# Patient Record
Sex: MALE | Race: WHITE | NOT HISPANIC OR LATINO | URBAN - METROPOLITAN AREA
[De-identification: names, ages, dates, MRNs, and addresses within clinical notes are randomized per-mention and may not be internally consistent; named-entity substitution may affect disease eponyms.]

---

## 2018-07-19 ENCOUNTER — OFFICE VISIT (OUTPATIENT)
Dept: URBAN - METROPOLITAN AREA CLINIC 18 | Facility: CLINIC | Age: 63
End: 2018-07-19
Payer: COMMERCIAL

## 2018-07-19 PROCEDURE — 92133 CPTRZD OPH DX IMG PST SGM ON: CPT | Performed by: OPTOMETRIST

## 2018-07-19 PROCEDURE — 92014 COMPRE OPH EXAM EST PT 1/>: CPT | Performed by: OPTOMETRIST

## 2018-07-19 RX ORDER — LATANOPROST 50 UG/ML
0.005 % SOLUTION OPHTHALMIC
Qty: 1 | Refills: 4 | Status: INACTIVE
Start: 2018-07-19 | End: 2018-08-23

## 2018-07-19 ASSESSMENT — INTRAOCULAR PRESSURE
OD: 20
OD: 28
OS: 30
OS: 25

## 2018-07-19 ASSESSMENT — KERATOMETRY
OS: 44.50
OD: 44.63

## 2018-07-19 NOTE — IMPRESSION/PLAN
Impression: Ocular hypertension, bilateral: H40.053. ONH stable OU, s/p LPI OU Tmax 30/29, /612
7/18 /93 stable in [facility] OU vs. OCT x 1yr ago  Plan: Discussed diagnosis in detail with patient. IOP elevated today. Recommend pt to re-start Latanoprost QHS OU (ERX'd) Goal IOP low 20s due to CCTs.

## 2018-08-23 ENCOUNTER — OFFICE VISIT (OUTPATIENT)
Dept: URBAN - METROPOLITAN AREA CLINIC 18 | Facility: CLINIC | Age: 63
End: 2018-08-23
Payer: COMMERCIAL

## 2018-08-23 PROCEDURE — 99213 OFFICE O/P EST LOW 20 MIN: CPT | Performed by: OPTOMETRIST

## 2018-08-23 RX ORDER — LATANOPROST 50 UG/ML
0.005 % SOLUTION OPHTHALMIC
Qty: 1 | Refills: 6 | Status: INACTIVE
Start: 2018-08-23 | End: 2018-12-06

## 2018-08-23 ASSESSMENT — KERATOMETRY
OD: 44.50
OS: 45.13

## 2018-08-23 ASSESSMENT — INTRAOCULAR PRESSURE
OS: 17
OD: 18

## 2018-12-06 ENCOUNTER — OFFICE VISIT (OUTPATIENT)
Dept: URBAN - METROPOLITAN AREA CLINIC 18 | Facility: CLINIC | Age: 63
End: 2018-12-06
Payer: COMMERCIAL

## 2018-12-06 PROCEDURE — 99213 OFFICE O/P EST LOW 20 MIN: CPT | Performed by: OPTOMETRIST

## 2018-12-06 RX ORDER — LATANOPROST 50 UG/ML
0.005 % SOLUTION OPHTHALMIC
Qty: 1 | Refills: 6 | Status: INACTIVE
Start: 2018-12-06 | End: 2019-08-23

## 2018-12-06 RX ORDER — TIMOLOL MALEATE 2.5 MG/ML
0.25 % SOLUTION/ DROPS OPHTHALMIC
Qty: 1 | Refills: 6 | Status: INACTIVE
Start: 2018-12-06 | End: 2019-08-23

## 2018-12-06 ASSESSMENT — KERATOMETRY
OD: 44.63
OS: 44.50

## 2018-12-06 ASSESSMENT — INTRAOCULAR PRESSURE
OD: 37
OS: 39

## 2018-12-06 NOTE — IMPRESSION/PLAN
Impression: Ocular hypertension, bilateral: H40.053. ONH stable OU, s/p LPI OU Tmax 30/29, /612
7/18 /93 stable in [facility] OU vs. OCT x 1yr ago IOP high today OU Plan: Discussed diagnosis in detail with patient. IOP stable. Recommend pt to re-start Latanoprost QHS OU (Lumigan) and Istalol BID OU (Timolol). Sample of lumigan and istalol given today.

## 2018-12-20 ENCOUNTER — OFFICE VISIT (OUTPATIENT)
Dept: URBAN - METROPOLITAN AREA CLINIC 18 | Facility: CLINIC | Age: 63
End: 2018-12-20
Payer: COMMERCIAL

## 2018-12-20 DIAGNOSIS — H40.053 OCULAR HYPERTENSION, BILATERAL: Primary | ICD-10-CM

## 2018-12-20 PROCEDURE — 99213 OFFICE O/P EST LOW 20 MIN: CPT | Performed by: OPTOMETRIST

## 2018-12-20 ASSESSMENT — KERATOMETRY
OD: 44.38
OS: 44.25

## 2018-12-20 ASSESSMENT — INTRAOCULAR PRESSURE
OS: 17
OD: 20

## 2018-12-20 NOTE — IMPRESSION/PLAN
Impression: Ocular hypertension, bilateral: H40.053. ONH stable OU, s/p LPI OU Tmax 30/29, /612
7/18 /93 stable in [facility] OU vs. OCT x 1yr ago IOP today 20/17 Goal teens. Plan: Discussed diagnosis in detail with patient. IOP stable. Pt will continue to use Latanoprost QHS OU (Lumigan) and Timolol BID OU.

## 2019-01-31 ENCOUNTER — OFFICE VISIT (OUTPATIENT)
Dept: URBAN - METROPOLITAN AREA CLINIC 18 | Facility: CLINIC | Age: 64
End: 2019-01-31
Payer: COMMERCIAL

## 2019-01-31 PROCEDURE — 99213 OFFICE O/P EST LOW 20 MIN: CPT | Performed by: OPTOMETRIST

## 2019-01-31 ASSESSMENT — KERATOMETRY
OS: 44.25
OD: 45.13

## 2019-01-31 ASSESSMENT — INTRAOCULAR PRESSURE
OD: 18
OS: 18

## 2019-04-08 ENCOUNTER — TESTING ONLY (OUTPATIENT)
Dept: URBAN - METROPOLITAN AREA CLINIC 18 | Facility: CLINIC | Age: 64
End: 2019-04-08
Payer: COMMERCIAL

## 2019-04-08 PROCEDURE — 92133 CPTRZD OPH DX IMG PST SGM ON: CPT | Performed by: OPTOMETRIST

## 2019-04-08 PROCEDURE — 76514 ECHO EXAM OF EYE THICKNESS: CPT | Performed by: OPTOMETRIST

## 2019-04-08 PROCEDURE — 92083 EXTENDED VISUAL FIELD XM: CPT | Performed by: OPTOMETRIST

## 2019-04-25 ENCOUNTER — OFFICE VISIT (OUTPATIENT)
Dept: URBAN - METROPOLITAN AREA CLINIC 18 | Facility: CLINIC | Age: 64
End: 2019-04-25
Payer: COMMERCIAL

## 2019-04-25 PROCEDURE — 99213 OFFICE O/P EST LOW 20 MIN: CPT | Performed by: OPTOMETRIST

## 2019-04-25 ASSESSMENT — INTRAOCULAR PRESSURE
OS: 9
OD: 10

## 2019-08-23 ENCOUNTER — OFFICE VISIT (OUTPATIENT)
Dept: URBAN - METROPOLITAN AREA CLINIC 18 | Facility: CLINIC | Age: 64
End: 2019-08-23
Payer: COMMERCIAL

## 2019-08-23 DIAGNOSIS — H04.123 DRY EYE SYNDROME OF BILATERAL LACRIMAL GLANDS: Chronic | ICD-10-CM

## 2019-08-23 DIAGNOSIS — H25.13 AGE-RELATED NUCLEAR CATARACT, BILATERAL: Chronic | ICD-10-CM

## 2019-08-23 PROCEDURE — 99213 OFFICE O/P EST LOW 20 MIN: CPT | Performed by: OPTOMETRIST

## 2019-08-23 RX ORDER — DIPHENHYDRAMINE HCL 25 MG
CAPSULE ORAL
Qty: 1 | Refills: 11 | Status: ACTIVE
Start: 2019-08-23

## 2019-08-23 RX ORDER — LATANOPROST 50 UG/ML
0.005 % SOLUTION OPHTHALMIC
Qty: 1 | Refills: 6 | Status: INACTIVE
Start: 2019-08-23 | End: 2020-12-16

## 2019-08-23 RX ORDER — TIMOLOL MALEATE 2.5 MG/ML
0.25 % SOLUTION/ DROPS OPHTHALMIC
Qty: 1 | Refills: 6 | Status: INACTIVE
Start: 2019-08-23 | End: 2020-12-16

## 2019-08-23 ASSESSMENT — INTRAOCULAR PRESSURE
OD: 12
OS: 13

## 2019-08-23 NOTE — IMPRESSION/PLAN
Impression: Ocular hypertension, bilateral: H40.053. Plan: Intraocular pressure well controlled, tolerating medications. Will continue with same regimen.  timolol BID OU and latanoprost qhs OU

## 2021-10-07 ENCOUNTER — OFFICE VISIT (OUTPATIENT)
Dept: URBAN - METROPOLITAN AREA CLINIC 18 | Facility: CLINIC | Age: 66
End: 2021-10-07
Payer: MEDICARE

## 2021-10-07 PROCEDURE — 92133 CPTRZD OPH DX IMG PST SGM ON: CPT | Performed by: STUDENT IN AN ORGANIZED HEALTH CARE EDUCATION/TRAINING PROGRAM

## 2021-10-07 PROCEDURE — 99214 OFFICE O/P EST MOD 30 MIN: CPT | Performed by: STUDENT IN AN ORGANIZED HEALTH CARE EDUCATION/TRAINING PROGRAM

## 2021-10-07 RX ORDER — LATANOPROST 50 UG/ML
0.005 % SOLUTION OPHTHALMIC
Qty: 10 | Refills: 6 | Status: ACTIVE
Start: 2021-10-07

## 2021-10-07 RX ORDER — TIMOLOL MALEATE 2.5 MG/ML
0.25 % SOLUTION/ DROPS OPHTHALMIC
Qty: 5 | Refills: 6 | Status: INACTIVE
Start: 2021-10-07 | End: 2022-02-18

## 2021-10-07 ASSESSMENT — INTRAOCULAR PRESSURE
OS: 22
OD: 20

## 2021-10-07 NOTE — IMPRESSION/PLAN
Impression: Ocular hypertension, bilateral: H40.053. OS>OD Condition: established, stable. IOPs elevated OU -off drops S/p DORON Plan: Discussed findings. Patient educated on importance of compliance with follow-ups and treatment. Continue with timolol BID OU and latanoprost qhs OU (ERX'd w/refills). Will reassess in 3mths. OCT ordered and reviewed w/pt today.

## 2022-02-18 ENCOUNTER — OFFICE VISIT (OUTPATIENT)
Dept: URBAN - METROPOLITAN AREA CLINIC 18 | Facility: CLINIC | Age: 67
End: 2022-02-18
Payer: MEDICARE

## 2022-02-18 DIAGNOSIS — H40.033 ANATOMICAL NARROW ANGLE, BILATERAL: Primary | ICD-10-CM

## 2022-02-18 PROCEDURE — 92083 EXTENDED VISUAL FIELD XM: CPT | Performed by: OPTOMETRIST

## 2022-02-18 PROCEDURE — 99213 OFFICE O/P EST LOW 20 MIN: CPT | Performed by: OPTOMETRIST

## 2022-02-18 RX ORDER — TIMOLOL MALEATE 2.5 MG/ML
0.25 % SOLUTION/ DROPS OPHTHALMIC
Qty: 5 | Refills: 6 | Status: ACTIVE
Start: 2022-02-18

## 2022-02-18 ASSESSMENT — INTRAOCULAR PRESSURE
OD: 14
OS: 14

## 2022-02-18 NOTE — IMPRESSION/PLAN
Impression: Anatomical narrow angle, bilateral: H40.033 OU. Plan: Intraocular pressure well controlled, tolerating medications. Will continue with same regimen. 24-2 VF does not show any meaningful change in VF loss or change in pattern.

## 2022-05-18 ENCOUNTER — OFFICE VISIT (OUTPATIENT)
Dept: URBAN - METROPOLITAN AREA CLINIC 18 | Facility: CLINIC | Age: 67
End: 2022-05-18
Payer: MEDICARE

## 2022-05-18 DIAGNOSIS — H40.053 OCULAR HYPERTENSION, BILATERAL: Primary | ICD-10-CM

## 2022-05-18 PROCEDURE — 99213 OFFICE O/P EST LOW 20 MIN: CPT | Performed by: OPTOMETRIST

## 2022-05-18 ASSESSMENT — INTRAOCULAR PRESSURE
OS: 10
OD: 10

## 2022-05-18 NOTE — IMPRESSION/PLAN
Impression: Ocular hypertension, bilateral: H40.053. Plan: IOP stable 10/10 continue with Latanoprost QHS OU, Timolol BID OU.

## 2022-08-18 ENCOUNTER — OFFICE VISIT (OUTPATIENT)
Dept: URBAN - METROPOLITAN AREA CLINIC 18 | Facility: CLINIC | Age: 67
End: 2022-08-18
Payer: MEDICARE

## 2022-08-18 DIAGNOSIS — H40.053 OCULAR HYPERTENSION, BILATERAL: Primary | ICD-10-CM

## 2022-08-18 PROCEDURE — 92133 CPTRZD OPH DX IMG PST SGM ON: CPT | Performed by: OPTOMETRIST

## 2022-08-18 PROCEDURE — 99213 OFFICE O/P EST LOW 20 MIN: CPT | Performed by: OPTOMETRIST

## 2022-08-18 RX ORDER — TIMOLOL MALEATE 2.5 MG/ML
0.25 % SOLUTION/ DROPS OPHTHALMIC
Qty: 5 | Refills: 6 | Status: ACTIVE
Start: 2022-08-18

## 2022-08-18 RX ORDER — LATANOPROST 50 UG/ML
0.005 % SOLUTION OPHTHALMIC
Qty: 5 | Refills: 6 | Status: ACTIVE
Start: 2022-08-18

## 2022-08-18 ASSESSMENT — INTRAOCULAR PRESSURE
OD: 13
OS: 13

## 2022-08-18 NOTE — IMPRESSION/PLAN
Impression: Ocular hypertension, bilateral: H40.053. Plan: Intraocular pressure well controlled, tolerating medications. Will continue with same regimen of Latanoprost QPM OU and Timolol BID OU.

## 2022-11-28 ENCOUNTER — OFFICE VISIT (OUTPATIENT)
Dept: URBAN - METROPOLITAN AREA CLINIC 18 | Facility: CLINIC | Age: 67
End: 2022-11-28
Payer: MEDICARE

## 2022-11-28 DIAGNOSIS — H40.053 OCULAR HYPERTENSION, BILATERAL: Primary | ICD-10-CM

## 2022-11-28 PROCEDURE — 99213 OFFICE O/P EST LOW 20 MIN: CPT | Performed by: OPTOMETRIST

## 2022-11-28 ASSESSMENT — INTRAOCULAR PRESSURE
OD: 16
OS: 18

## 2022-11-28 NOTE — IMPRESSION/PLAN
Impression: Ocular hypertension, bilateral: H40.053. Plan: Intraocular pressure well controlled, tolerating medications. Will continue with same regimen Timolol BID OU and Latanoprost QHS OU.

## 2023-05-18 ENCOUNTER — OFFICE VISIT (OUTPATIENT)
Dept: URBAN - METROPOLITAN AREA CLINIC 18 | Facility: CLINIC | Age: 68
End: 2023-05-18
Payer: MEDICARE

## 2023-05-18 DIAGNOSIS — H40.053 OCULAR HYPERTENSION, BILATERAL: Primary | ICD-10-CM

## 2023-05-18 PROCEDURE — 99213 OFFICE O/P EST LOW 20 MIN: CPT | Performed by: OPTOMETRIST

## 2023-05-18 RX ORDER — LATANOPROST 50 UG/ML
0.005 % SOLUTION OPHTHALMIC
Qty: 5 | Refills: 6 | Status: ACTIVE
Start: 2023-05-18

## 2023-05-18 RX ORDER — OLOPATADINE HYDROCHLORIDE OPHTHALMIC 2 MG/ML
0.2 % SOLUTION OPHTHALMIC
Qty: 5 | Refills: 11 | Status: ACTIVE
Start: 2023-05-18

## 2023-05-18 RX ORDER — TIMOLOL MALEATE 2.5 MG/ML
0.25 % SOLUTION/ DROPS OPHTHALMIC
Qty: 5 | Refills: 6 | Status: ACTIVE
Start: 2023-05-18

## 2023-05-18 ASSESSMENT — INTRAOCULAR PRESSURE
OS: 14
OD: 18

## 2023-05-18 NOTE — IMPRESSION/PLAN
Impression: Ocular hypertension, bilateral: H40.053. Plan: Intraocular pressure well controlled, tolerating medications. Will continue with same regimen of Timolol BID OU and Latanoprost QHS OU. Also send Rx of olopatadine QD OU prn for allergies.

## 2024-06-12 ENCOUNTER — OFFICE VISIT (OUTPATIENT)
Dept: URBAN - METROPOLITAN AREA CLINIC 18 | Facility: CLINIC | Age: 69
End: 2024-06-12
Payer: MEDICARE

## 2024-06-12 DIAGNOSIS — H25.13 AGE-RELATED NUCLEAR CATARACT, BILATERAL: ICD-10-CM

## 2024-06-12 DIAGNOSIS — H40.013 OPEN ANGLE WITH BORDERLINE FINDINGS, LOW RISK, BILATERAL: Primary | ICD-10-CM

## 2024-06-12 DIAGNOSIS — H40.033 ANATOMICAL NARROW ANGLE, BILATERAL: ICD-10-CM

## 2024-06-12 PROCEDURE — 99213 OFFICE O/P EST LOW 20 MIN: CPT

## 2024-06-12 PROCEDURE — 92133 CPTRZD OPH DX IMG PST SGM ON: CPT

## 2024-06-12 RX ORDER — TIMOLOL MALEATE 2.5 MG/ML
0.25 % SOLUTION/ DROPS OPHTHALMIC
Qty: 5 | Refills: 6 | Status: ACTIVE
Start: 2024-06-12

## 2024-06-12 RX ORDER — LATANOPROST 50 UG/ML
0.005 % SOLUTION OPHTHALMIC
Qty: 5 | Refills: 6 | Status: ACTIVE
Start: 2024-06-12

## 2024-06-12 ASSESSMENT — INTRAOCULAR PRESSURE
OD: 18
OS: 17
OS: 18
OD: 15

## 2025-05-12 ENCOUNTER — OFFICE VISIT (OUTPATIENT)
Dept: URBAN - METROPOLITAN AREA CLINIC 18 | Facility: CLINIC | Age: 70
End: 2025-05-12
Payer: MEDICARE

## 2025-05-12 DIAGNOSIS — H04.123 DRY EYE SYNDROME OF BILATERAL LACRIMAL GLANDS: ICD-10-CM

## 2025-05-12 DIAGNOSIS — H11.153 PINGUECULA, BILATERAL: ICD-10-CM

## 2025-05-12 DIAGNOSIS — H40.013 OPEN ANGLE WITH BORDERLINE FINDINGS, LOW RISK, BILATERAL: ICD-10-CM

## 2025-05-12 DIAGNOSIS — H25.13 AGE-RELATED NUCLEAR CATARACT, BILATERAL: Primary | ICD-10-CM

## 2025-05-12 PROCEDURE — 92014 COMPRE OPH EXAM EST PT 1/>: CPT

## 2025-05-12 PROCEDURE — 92133 CPTRZD OPH DX IMG PST SGM ON: CPT

## 2025-05-12 RX ORDER — LATANOPROST 50 UG/ML
0.005 % SOLUTION OPHTHALMIC
Qty: 5 | Refills: 6 | Status: ACTIVE
Start: 2025-05-12

## 2025-05-12 RX ORDER — TIMOLOL MALEATE 2.5 MG/ML
0.25 % SOLUTION/ DROPS OPHTHALMIC
Qty: 15 | Refills: 3 | Status: ACTIVE
Start: 2025-05-12

## 2025-05-12 ASSESSMENT — INTRAOCULAR PRESSURE
OD: 17
OS: 16